# Patient Record
Sex: FEMALE | Race: WHITE | NOT HISPANIC OR LATINO | URBAN - METROPOLITAN AREA
[De-identification: names, ages, dates, MRNs, and addresses within clinical notes are randomized per-mention and may not be internally consistent; named-entity substitution may affect disease eponyms.]

---

## 2024-01-29 ENCOUNTER — OFFICE VISIT (OUTPATIENT)
Dept: OBSTETRICS AND GYNECOLOGY | Facility: CLINIC | Age: 29
End: 2024-01-29
Payer: COMMERCIAL

## 2024-01-29 VITALS
WEIGHT: 199 LBS | SYSTOLIC BLOOD PRESSURE: 106 MMHG | BODY MASS INDEX: 36.62 KG/M2 | DIASTOLIC BLOOD PRESSURE: 64 MMHG | HEIGHT: 62 IN

## 2024-01-29 DIAGNOSIS — Z01.419 ENCOUNTER FOR ANNUAL ROUTINE GYNECOLOGICAL EXAMINATION: Primary | ICD-10-CM

## 2024-01-29 DIAGNOSIS — Z30.49 ENCOUNTER FOR SURVEILLANCE OF NUVARING: ICD-10-CM

## 2024-01-29 PROCEDURE — 99385 PREV VISIT NEW AGE 18-39: CPT

## 2024-01-29 PROCEDURE — 88175 CYTOPATH C/V AUTO FLUID REDO: CPT | Mod: TC,GCY,WESLAB

## 2024-01-29 PROCEDURE — 1036F TOBACCO NON-USER: CPT

## 2024-01-29 RX ORDER — ETONOGESTREL AND ETHINYL ESTRADIOL VAGINAL RING .015; .12 MG/D; MG/D
1 RING VAGINAL
COMMUNITY
End: 2024-01-29 | Stop reason: SDUPTHER

## 2024-01-29 RX ORDER — ETONOGESTREL AND ETHINYL ESTRADIOL VAGINAL RING .015; .12 MG/D; MG/D
RING VAGINAL
Qty: 3 EACH | Refills: 3 | Status: SHIPPED | OUTPATIENT
Start: 2024-01-29

## 2024-01-29 ASSESSMENT — ENCOUNTER SYMPTOMS
FEVER: 0
FATIGUE: 0
UNEXPECTED WEIGHT CHANGE: 0
NAUSEA: 0
CHILLS: 0
OCCASIONAL FEELINGS OF UNSTEADINESS: 0
DIZZINESS: 0
LOSS OF SENSATION IN FEET: 0
DEPRESSION: 0
SHORTNESS OF BREATH: 0
DYSURIA: 0
COLOR CHANGE: 0
VOMITING: 0
HEADACHES: 0
ABDOMINAL PAIN: 0
COUGH: 0

## 2024-01-29 ASSESSMENT — LIFESTYLE VARIABLES
SKIP TO QUESTIONS 9-10: 1
HOW MANY STANDARD DRINKS CONTAINING ALCOHOL DO YOU HAVE ON A TYPICAL DAY: PATIENT DOES NOT DRINK
AUDIT-C TOTAL SCORE: 0
HOW OFTEN DO YOU HAVE SIX OR MORE DRINKS ON ONE OCCASION: NEVER
HOW OFTEN DO YOU HAVE A DRINK CONTAINING ALCOHOL: NEVER

## 2024-01-29 ASSESSMENT — PATIENT HEALTH QUESTIONNAIRE - PHQ9
SUM OF ALL RESPONSES TO PHQ9 QUESTIONS 1 & 2: 0
2. FEELING DOWN, DEPRESSED OR HOPELESS: NOT AT ALL
1. LITTLE INTEREST OR PLEASURE IN DOING THINGS: NOT AT ALL

## 2024-01-29 ASSESSMENT — PAIN SCALES - GENERAL: PAINLEVEL: 0-NO PAIN

## 2024-01-29 NOTE — PROGRESS NOTES
"Subjective   DESI PHILLIPS is a 28 y.o. female who is here for a routine GYN exam. Last saw Dr. Palomo 2019. Now a \"new\" patient. I've seen her mom (Kristen) and grandma (Lori)! She is doing well, no concerns or complaints. Tolerating the Nuvaring well; no CI to medication; needs refills. Denies breast changes or concerns. Denies vaginal concerns.     Complaints:   none  Periods: regular  Dysmenorrhea:  none    Current contraception: Nuvaring  History of abnormal Pap smear: no  History of abnormal mammogram: no      OB History          1    Para   1    Term   1            AB        Living   1         SAB        IAB        Ectopic        Multiple        Live Births   1                  Review of Systems   Constitutional:  Negative for chills, fatigue, fever and unexpected weight change.   Respiratory:  Negative for cough and shortness of breath.    Gastrointestinal:  Negative for abdominal pain, nausea and vomiting.   Genitourinary:  Negative for dyspareunia, dysuria, pelvic pain and vaginal discharge.   Skin:  Negative for color change and rash.   Neurological:  Negative for dizziness and headaches.       Objective   /64   Ht 1.562 m (5' 1.5\")   Wt 90.3 kg (199 lb)   LMP 2024   BMI 36.99 kg/m²        General:   Alert and oriented, in no acute distress   Neck: Supple. No visible thyromegaly.    Breast/Axilla: Normal to palpation bilaterally without masses, skin changes, or nipple discharge.    Abdomen: Soft, non-tender, without masses or organomegaly   Vulva: Normal architecture without erythema, masses, or lesions.    Vagina: Normal mucosa without lesions, masses, or atrophy. No abnormal vaginal discharge.    Cervix: Normal without masses, lesions, or signs of cervicitis; pap smear performed    Uterus: Normal, mobile, non-enlarged uterus   Adnexa: Normal without masses or lesions   Pelvic Floor Normal    Psych Normal affect. Normal mood.      Assessment/Plan   -Due for pap " smear, obtained.  -Continue Nuvaring as tolerated, no CI to medication, refills sent.     Jennifer Keller PA-C

## 2024-02-10 LAB
CYTOLOGY CMNT CVX/VAG CYTO-IMP: NORMAL
LAB AP CONTRACEPTIVE HISTORY: NORMAL
LAB AP HPV GENOTYPE QUESTION: YES
LAB AP HPV HR: NORMAL
LABORATORY COMMENT REPORT: NORMAL
LMP START DATE: NORMAL
PATH REPORT.TOTAL CANCER: NORMAL

## 2025-02-12 ENCOUNTER — OFFICE VISIT (OUTPATIENT)
Dept: OBSTETRICS AND GYNECOLOGY | Facility: CLINIC | Age: 30
End: 2025-02-12
Payer: COMMERCIAL

## 2025-02-12 VITALS
DIASTOLIC BLOOD PRESSURE: 74 MMHG | SYSTOLIC BLOOD PRESSURE: 108 MMHG | BODY MASS INDEX: 38.24 KG/M2 | HEIGHT: 62 IN | WEIGHT: 207.8 LBS

## 2025-02-12 DIAGNOSIS — Z30.49 ENCOUNTER FOR SURVEILLANCE OF NUVARING: ICD-10-CM

## 2025-02-12 DIAGNOSIS — Z01.419 ENCOUNTER FOR ANNUAL ROUTINE GYNECOLOGICAL EXAMINATION: Primary | ICD-10-CM

## 2025-02-12 PROCEDURE — 99395 PREV VISIT EST AGE 18-39: CPT

## 2025-02-12 PROCEDURE — 3008F BODY MASS INDEX DOCD: CPT

## 2025-02-12 PROCEDURE — 1036F TOBACCO NON-USER: CPT

## 2025-02-12 RX ORDER — ETONOGESTREL AND ETHINYL ESTRADIOL VAGINAL RING .015; .12 MG/D; MG/D
RING VAGINAL
Qty: 3 EACH | Refills: 3 | Status: SHIPPED | OUTPATIENT
Start: 2025-02-12

## 2025-02-12 ASSESSMENT — ENCOUNTER SYMPTOMS
COLOR CHANGE: 0
DEPRESSION: 0
OCCASIONAL FEELINGS OF UNSTEADINESS: 0
COUGH: 0
DIZZINESS: 0
FEVER: 0
NAUSEA: 0
UNEXPECTED WEIGHT CHANGE: 0
FATIGUE: 0
HEADACHES: 0
LOSS OF SENSATION IN FEET: 0
CHILLS: 0
ABDOMINAL PAIN: 0
VOMITING: 0
DYSURIA: 0
SHORTNESS OF BREATH: 0

## 2025-02-12 ASSESSMENT — LIFESTYLE VARIABLES
HOW OFTEN DO YOU HAVE A DRINK CONTAINING ALCOHOL: NEVER
SKIP TO QUESTIONS 9-10: 1
HOW MANY STANDARD DRINKS CONTAINING ALCOHOL DO YOU HAVE ON A TYPICAL DAY: PATIENT DOES NOT DRINK
HOW OFTEN DO YOU HAVE SIX OR MORE DRINKS ON ONE OCCASION: NEVER
AUDIT-C TOTAL SCORE: 0

## 2025-02-12 ASSESSMENT — PATIENT HEALTH QUESTIONNAIRE - PHQ9
SUM OF ALL RESPONSES TO PHQ9 QUESTIONS 1 & 2: 0
1. LITTLE INTEREST OR PLEASURE IN DOING THINGS: NOT AT ALL
2. FEELING DOWN, DEPRESSED OR HOPELESS: NOT AT ALL

## 2025-02-12 ASSESSMENT — PAIN SCALES - GENERAL: PAINLEVEL_OUTOF10: 0-NO PAIN

## 2025-02-12 NOTE — PROGRESS NOTES
"Subjective   DESI PHILLIPS is a 29 y.o. female who is here for a routine GYN exam. I last saw her 2024.  -Overall doing well! Her son is turning 3 yo and they're having big family party! Also working on building their patio at their home. Planning to try conceiving again starting in !  -Using Nuvaring currently continuously; more recently changed her ring and feels she might've done so 1 week later than usual; has been lightly spotting since  when she swapped rings. Will rarely bleed with continuous use; sometimes will lightly spot every 2 months or so.  -Denies breast or vaginal concerns today.    Complaints:   none  Periods: irregular/absent with continuous Nuvaring  Dysmenorrhea:  none    Current contraception: Nuvaring  History of abnormal Pap smear: no  History of abnormal mammogram: no      OB History          1    Para   1    Term   1            AB        Living   1         SAB        IAB        Ectopic        Multiple        Live Births   1                  Review of Systems   Constitutional:  Negative for chills, fatigue, fever and unexpected weight change.   Respiratory:  Negative for cough and shortness of breath.    Gastrointestinal:  Negative for abdominal pain, nausea and vomiting.   Genitourinary:  Negative for dyspareunia, dysuria, pelvic pain and vaginal discharge.   Skin:  Negative for color change and rash.   Neurological:  Negative for dizziness and headaches.       Objective   /74   Ht 1.562 m (5' 1.5\")   Wt 94.3 kg (207 lb 12.8 oz)   LMP 2025   BMI 38.63 kg/m²        General:   Alert and oriented, in no acute distress   Neck: Supple. No visible thyromegaly.    Breast/Axilla: Normal to palpation bilaterally without masses, skin changes, or nipple discharge.    Abdomen: Soft, non-tender, without masses or organomegaly   Vulva: Normal architecture without erythema, masses, or lesions.    Vagina: Normal mucosa without lesions, masses, or atrophy. No " abnormal vaginal discharge. Scant amt blood; nuvaring in place   Cervix: Normal without masses, lesions, or signs of cervicitis   Uterus: Normal, mobile, non-enlarged uterus   Adnexa: Normal without masses or lesions   Pelvic Floor Normal    Psych Normal affect. Normal mood.      Assessment/Plan   Diagnoses and all orders for this visit:  Encounter for annual routine gynecological examination   - UTD on pap smear, next due 1/2027.  Encounter for surveillance of nuvaring  -     etonogestreL-ethinyl estradioL (Nuvaring) 0.12-0.015 mg/24 hr vaginal ring; Insert ring vaginally, leave in place for 3 weeks total, then replace on week 4 for continuous use  - Suspect possible BTB with continuous vs delayed changing of device; scant blood on exam; plan to continue as prescribed; we rvwd again how often to change her ring; TCO if bleeding does not resolve.    Jennifer Keller PA-C

## 2025-05-19 ENCOUNTER — PATIENT MESSAGE (OUTPATIENT)
Dept: OBSTETRICS AND GYNECOLOGY | Facility: CLINIC | Age: 30
End: 2025-05-19
Payer: COMMERCIAL

## 2025-05-20 DIAGNOSIS — R63.5 WEIGHT GAIN: Primary | ICD-10-CM

## 2025-05-20 NOTE — PATIENT COMMUNICATION
Pt does exercise several times a week, patient would like to have blood work completed. Please place orders

## 2025-05-22 LAB
ALBUMIN SERPL-MCNC: 4.5 G/DL (ref 3.6–5.1)
ALP SERPL-CCNC: 39 U/L (ref 31–125)
ALT SERPL-CCNC: 41 U/L (ref 6–29)
ANION GAP SERPL CALCULATED.4IONS-SCNC: 10 MMOL/L (CALC) (ref 7–17)
AST SERPL-CCNC: 29 U/L (ref 10–30)
BILIRUB SERPL-MCNC: 0.7 MG/DL (ref 0.2–1.2)
BUN SERPL-MCNC: 19 MG/DL (ref 7–25)
CALCIUM SERPL-MCNC: 9.6 MG/DL (ref 8.6–10.2)
CHLORIDE SERPL-SCNC: 103 MMOL/L (ref 98–110)
CHOLEST SERPL-MCNC: 240 MG/DL
CHOLEST/HDLC SERPL: 4.5 (CALC)
CO2 SERPL-SCNC: 25 MMOL/L (ref 20–32)
CREAT SERPL-MCNC: 0.73 MG/DL (ref 0.5–0.97)
EGFRCR SERPLBLD CKD-EPI 2021: 113 ML/MIN/1.73M2
EST. AVERAGE GLUCOSE BLD GHB EST-MCNC: 117 MG/DL
EST. AVERAGE GLUCOSE BLD GHB EST-SCNC: 6.5 MMOL/L
GLUCOSE SERPL-MCNC: 94 MG/DL (ref 65–99)
HBA1C MFR BLD: 5.7 %
HDLC SERPL-MCNC: 53 MG/DL
LDLC SERPL CALC-MCNC: 152 MG/DL (CALC)
NONHDLC SERPL-MCNC: 187 MG/DL (CALC)
POTASSIUM SERPL-SCNC: 4.7 MMOL/L (ref 3.5–5.3)
PROT SERPL-MCNC: 7.2 G/DL (ref 6.1–8.1)
SODIUM SERPL-SCNC: 138 MMOL/L (ref 135–146)
TRIGL SERPL-MCNC: 201 MG/DL
TSH SERPL-ACNC: 3.16 MIU/L

## 2025-07-18 NOTE — PROGRESS NOTES
INITIAL OB VISIT  Subjective   Patient ID [id number]   DESI PHILLIPS is a 29 y.o.  at 9w0d, OLESYA 2026, by Last Menstrual Period who presents for an initial prenatal visit.  She is doing well today without issues. Denies vaginal bleeding or cramping.    Her last pap smear was:  24- NILM    Her pregnancy is notable for:  -Obesity: BMI 40.15 at NOB. Will need growth US at 30,36 wks and NST weekly starting at 34 weeks    OB History    Para Term  AB Living   2 1 1   1   SAB IAB Ectopic Multiple Live Births       1      # Outcome Date GA Lbr Marin/2nd Weight Sex Type Anes PTL Lv   2 Current            1 Term 23 38w5d  3.572 kg M CS-LTranv   ARIELLA      Complications: Hypertension     [place]  Depression Scale Total: 0    Medical History[1]    Surgical History[2]    Family History[3]     Current Outpatient Medications   Medication Instructions    etonogestreL-ethinyl estradioL (Nuvaring) 0.12-0.015 mg/24 hr vaginal ring Insert ring vaginally, leave in place for 3 weeks total, then replace on week 4 for continuous use    prenatal 115/iron/folic acid (PRENATAL 19 ORAL) Take by mouth.      RX Allergies[4]    Objective   Vitals  Visit Vitals  /82     Weight: 98 kg (216 lb)  Pregravid BMI: Could not be calculated  BP: 120/82        Physical Exam  General: Alert and oriented, in no acute distress.  Psych: Normal affect and mood. Able to answer questions appropriately.   Lungs: Non labored respirations.    PROCEDURE:  OB/GYN Transvaginal U/S  Intrauterine - Yes  Gestational sac- Present  Yolk Sac Not seen  Fetal Pole- Not seen    Prenatal Labs  Results for orders placed or performed in visit on 25   POCT pregnancy, urine manually resulted    Collection Time: 25 10:00 AM   Result Value Ref Range    Preg Test, Ur Positive (A) Negative   POCT UA Automated manually resulted    Collection Time: 25 10:00 AM   Result Value Ref Range    POC Color, Urine Yellow Straw,  Yellow, Light-Yellow    POC Appearance, Urine Clear Clear    POC Glucose, Urine NEGATIVE NEGATIVE mg/dl    POC Bilirubin, Urine NEGATIVE NEGATIVE    POC Ketones, Urine NEGATIVE NEGATIVE mg/dl    POC Specific Gravity, Urine 1.015 1.005 - 1.035    POC Blood, Urine TRACE-Intact (A) NEGATIVE    POC PH, Urine 7.0 No Reference Range Established PH    POC Protein, Urine NEGATIVE NEGATIVE mg/dl    POC Urobilinogen, Urine 0.2 0.2, 1.0 EU/DL    Poc Nitrite, Urine NEGATIVE NEGATIVE    POC Leukocytes, Urine TRACE (A) NEGATIVE     Assessment/Plan     29 y.o.  at 9w0d, OLESYA 2026, by Last Menstrual Period  Assessment & Plan  Pregnancy test positive ([facility])  -UPT positive in the office today  -Patient is asymptomatic  -Possible gestational sac seen without yolk sac or fetal pole  -Discussed possibilities of early intrauterine pregnancy vs incorrect dating vs abnormal pregnancy  -Advised to get a formal US for further evaluation along with BHCG, CBC, T&S  -Bleeding/return precautions reviewed thoroughly  -Follow up after US/labs   -She voiced understanding and is in agreement with this plan of care  Orders:    QUEST HCG, TOTAL, QN; Future    CBC Anemia Panel With Reflex,Pregnancy; Future    Type And Screen Is this order related to pregnancy or an upcoming surgery? No; Future    US PELVIS TRANSABDOMINAL WITH TRANSVAGINAL; Future    Urine culture    Caroline Jimenes MD         [1] History reviewed. No pertinent past medical history.  [2]   Past Surgical History:  Procedure Laterality Date     SECTION, LOW TRANSVERSE  23    NASAL SEPTUM SURGERY     [3]   Family History  Problem Relation Name Age of Onset    No Known Problems Mother      No Known Problems Father      Lung cancer Maternal Grandfather     [4] No Known Allergies

## 2025-07-21 ENCOUNTER — INITIAL PRENATAL (OUTPATIENT)
Dept: OBSTETRICS AND GYNECOLOGY | Facility: CLINIC | Age: 30
End: 2025-07-21
Payer: COMMERCIAL

## 2025-07-21 ENCOUNTER — LAB (OUTPATIENT)
Dept: LAB | Facility: HOSPITAL | Age: 30
End: 2025-07-21
Payer: COMMERCIAL

## 2025-07-21 VITALS — BODY MASS INDEX: 40.15 KG/M2 | WEIGHT: 216 LBS | SYSTOLIC BLOOD PRESSURE: 120 MMHG | DIASTOLIC BLOOD PRESSURE: 82 MMHG

## 2025-07-21 DIAGNOSIS — Z34.91 PRENATAL CARE IN FIRST TRIMESTER, UNSPECIFIED GRAVIDITY: Primary | ICD-10-CM

## 2025-07-21 DIAGNOSIS — Z32.01 PREGNANCY TEST POSITIVE (HHS-HCC): ICD-10-CM

## 2025-07-21 DIAGNOSIS — Z32.01 ENCOUNTER FOR PREGNANCY TEST, RESULT POSITIVE (HHS-HCC): Primary | ICD-10-CM

## 2025-07-21 LAB
ABO GROUP (TYPE) IN BLOOD: NORMAL
ANTIBODY SCREEN: NORMAL
ERYTHROCYTE [DISTWIDTH] IN BLOOD BY AUTOMATED COUNT: 13.7 % (ref 11.5–14.5)
HCT VFR BLD AUTO: 39.5 % (ref 36–46)
HGB BLD-MCNC: 13.1 G/DL (ref 12–16)
MCH RBC QN AUTO: 29.5 PG (ref 26–34)
MCHC RBC AUTO-ENTMCNC: 33.2 G/DL (ref 32–36)
MCV RBC AUTO: 89 FL (ref 80–100)
NRBC BLD-RTO: 0 /100 WBCS (ref 0–0)
PLATELET # BLD AUTO: 248 X10*3/UL (ref 150–450)
POC APPEARANCE, URINE: CLEAR
POC BILIRUBIN, URINE: NEGATIVE
POC BLOOD, URINE: ABNORMAL
POC COLOR, URINE: YELLOW
POC GLUCOSE, URINE: NEGATIVE MG/DL
POC KETONES, URINE: NEGATIVE MG/DL
POC LEUKOCYTES, URINE: ABNORMAL
POC NITRITE,URINE: NEGATIVE
POC PH, URINE: 7 PH
POC PROTEIN, URINE: NEGATIVE MG/DL
POC SPECIFIC GRAVITY, URINE: 1.01
POC UROBILINOGEN, URINE: 0.2 EU/DL
PREGNANCY TEST URINE, POC: POSITIVE
RBC # BLD AUTO: 4.44 X10*6/UL (ref 4–5.2)
RH FACTOR (ANTIGEN D): NORMAL
WBC # BLD AUTO: 6.5 X10*3/UL (ref 4.4–11.3)

## 2025-07-21 PROCEDURE — 86850 RBC ANTIBODY SCREEN: CPT

## 2025-07-21 PROCEDURE — 86900 BLOOD TYPING SEROLOGIC ABO: CPT

## 2025-07-21 PROCEDURE — 85027 COMPLETE CBC AUTOMATED: CPT

## 2025-07-21 PROCEDURE — 0500F INITIAL PRENATAL CARE VISIT: CPT | Performed by: STUDENT IN AN ORGANIZED HEALTH CARE EDUCATION/TRAINING PROGRAM

## 2025-07-21 PROCEDURE — 81025 URINE PREGNANCY TEST: CPT | Performed by: STUDENT IN AN ORGANIZED HEALTH CARE EDUCATION/TRAINING PROGRAM

## 2025-07-21 PROCEDURE — 81003 URINALYSIS AUTO W/O SCOPE: CPT | Mod: QW | Performed by: STUDENT IN AN ORGANIZED HEALTH CARE EDUCATION/TRAINING PROGRAM

## 2025-07-21 PROCEDURE — 86901 BLOOD TYPING SEROLOGIC RH(D): CPT

## 2025-07-21 ASSESSMENT — ENCOUNTER SYMPTOMS
DEPRESSION: 0
OCCASIONAL FEELINGS OF UNSTEADINESS: 0
LOSS OF SENSATION IN FEET: 0

## 2025-07-21 ASSESSMENT — EDINBURGH POSTNATAL DEPRESSION SCALE (EPDS)
I HAVE BEEN SO UNHAPPY THAT I HAVE BEEN CRYING: NO, NEVER
I HAVE BEEN ABLE TO LAUGH AND SEE THE FUNNY SIDE OF THINGS: AS MUCH AS I ALWAYS COULD
THE THOUGHT OF HARMING MYSELF HAS OCCURRED TO ME: NEVER
I HAVE BEEN ANXIOUS OR WORRIED FOR NO GOOD REASON: NO, NOT AT ALL
THINGS HAVE BEEN GETTING ON TOP OF ME: NO, I HAVE BEEN COPING AS WELL AS EVER
I HAVE BEEN SO UNHAPPY THAT I HAVE HAD DIFFICULTY SLEEPING: NOT AT ALL
I HAVE FELT SAD OR MISERABLE: NO, NOT AT ALL
TOTAL SCORE: 0
I HAVE LOOKED FORWARD WITH ENJOYMENT TO THINGS: AS MUCH AS I EVER DID
I HAVE BLAMED MYSELF UNNECESSARILY WHEN THINGS WENT WRONG: NO, NEVER
I HAVE FELT SCARED OR PANICKY FOR NO GOOD REASON: NO, NOT AT ALL

## 2025-07-21 ASSESSMENT — PAIN - FUNCTIONAL ASSESSMENT: PAIN_FUNCTIONAL_ASSESSMENT: 0-10

## 2025-07-21 ASSESSMENT — PAIN SCALES - GENERAL: PAINLEVEL_OUTOF10: 0 - NO PAIN

## 2025-07-22 LAB — REFLEX ADDED, ANEMIA PANEL: NORMAL

## 2025-07-23 LAB — B-HCG SERPL-ACNC: ABNORMAL MIU/ML

## 2025-07-25 ENCOUNTER — HOSPITAL ENCOUNTER (OUTPATIENT)
Dept: RADIOLOGY | Facility: HOSPITAL | Age: 30
Discharge: HOME | End: 2025-07-25
Payer: COMMERCIAL

## 2025-07-25 DIAGNOSIS — Z32.01 PREGNANCY TEST POSITIVE (HHS-HCC): ICD-10-CM

## 2025-07-25 PROCEDURE — 76801 OB US < 14 WKS SINGLE FETUS: CPT

## 2025-07-27 DIAGNOSIS — Z32.01 PREGNANCY TEST POSITIVE (HHS-HCC): ICD-10-CM

## 2025-07-28 ENCOUNTER — PATIENT MESSAGE (OUTPATIENT)
Dept: OBSTETRICS AND GYNECOLOGY | Facility: CLINIC | Age: 30
End: 2025-07-28
Payer: COMMERCIAL

## 2025-07-28 DIAGNOSIS — Z32.01 PREGNANCY TEST POSITIVE (HHS-HCC): Primary | ICD-10-CM

## 2025-07-28 NOTE — TELEPHONE ENCOUNTER
Patient called the office again. Advised her to monitor the bleeding and if she starts bleeding through more than a pad an hour to go to ER. Advised that the US has not been read yet. If Dr. Jimenes has not gotten back to me by tomorrow morning then I reach out to her at the hospital.

## 2025-07-29 DIAGNOSIS — Z32.01 PREGNANCY TEST POSITIVE (HHS-HCC): Primary | ICD-10-CM

## 2025-07-30 LAB — B-HCG SERPL-ACNC: ABNORMAL MIU/ML

## 2025-07-31 DIAGNOSIS — Z32.01 PREGNANCY TEST POSITIVE (HHS-HCC): ICD-10-CM

## 2025-08-01 LAB — B-HCG SERPL-ACNC: 8454 MIU/ML

## 2025-08-04 ENCOUNTER — APPOINTMENT (OUTPATIENT)
Dept: RADIOLOGY | Facility: HOSPITAL | Age: 30
End: 2025-08-04
Payer: COMMERCIAL

## 2025-08-04 DIAGNOSIS — Z32.01 PREGNANCY TEST POSITIVE (HHS-HCC): ICD-10-CM

## 2025-08-04 PROCEDURE — 76817 TRANSVAGINAL US OBSTETRIC: CPT | Performed by: STUDENT IN AN ORGANIZED HEALTH CARE EDUCATION/TRAINING PROGRAM

## 2025-08-04 PROCEDURE — 76815 OB US LIMITED FETUS(S): CPT | Performed by: STUDENT IN AN ORGANIZED HEALTH CARE EDUCATION/TRAINING PROGRAM

## 2025-08-04 PROCEDURE — 76830 TRANSVAGINAL US NON-OB: CPT

## 2025-08-06 ENCOUNTER — PATIENT MESSAGE (OUTPATIENT)
Dept: OBSTETRICS AND GYNECOLOGY | Facility: CLINIC | Age: 30
End: 2025-08-06
Payer: COMMERCIAL

## 2025-08-07 ENCOUNTER — HOSPITAL ENCOUNTER (INPATIENT)
Facility: HOSPITAL | Age: 30
End: 2025-08-07
Payer: COMMERCIAL

## 2025-08-07 ENCOUNTER — HOSPITAL ENCOUNTER (OUTPATIENT)
Facility: HOSPITAL | Age: 30
Setting detail: OBSERVATION
End: 2025-08-07
Payer: COMMERCIAL

## 2025-08-07 ENCOUNTER — HOSPITAL ENCOUNTER (INPATIENT)
Age: 30
End: 2025-08-07
Payer: COMMERCIAL

## 2025-08-07 ENCOUNTER — ANESTHESIA (OUTPATIENT)
Dept: OBSTETRICS AND GYNECOLOGY | Facility: HOSPITAL | Age: 30
End: 2025-08-07
Payer: COMMERCIAL

## 2025-08-07 ENCOUNTER — OFFICE VISIT (OUTPATIENT)
Dept: OBSTETRICS AND GYNECOLOGY | Facility: CLINIC | Age: 30
End: 2025-08-07
Payer: COMMERCIAL

## 2025-08-07 ENCOUNTER — HOSPITAL ENCOUNTER (OUTPATIENT)
Facility: HOSPITAL | Age: 30
Setting detail: OUTPATIENT SURGERY
End: 2025-08-07
Payer: COMMERCIAL

## 2025-08-07 ENCOUNTER — HOSPITAL ENCOUNTER (EMERGENCY)
Facility: HOSPITAL | Age: 30
Discharge: ANOTHER HOSPITAL | End: 2025-08-07
Attending: STUDENT IN AN ORGANIZED HEALTH CARE EDUCATION/TRAINING PROGRAM
Payer: COMMERCIAL

## 2025-08-07 ENCOUNTER — ANESTHESIA EVENT (OUTPATIENT)
Dept: OBSTETRICS AND GYNECOLOGY | Facility: HOSPITAL | Age: 30
End: 2025-08-07
Payer: COMMERCIAL

## 2025-08-07 ENCOUNTER — HOSPITAL ENCOUNTER (OUTPATIENT)
Facility: HOSPITAL | Age: 30
Discharge: HOME | End: 2025-08-07
Payer: COMMERCIAL

## 2025-08-07 VITALS — BODY MASS INDEX: 40.15 KG/M2 | DIASTOLIC BLOOD PRESSURE: 82 MMHG | WEIGHT: 216 LBS | SYSTOLIC BLOOD PRESSURE: 114 MMHG

## 2025-08-07 VITALS
HEART RATE: 108 BPM | HEIGHT: 62 IN | DIASTOLIC BLOOD PRESSURE: 60 MMHG | BODY MASS INDEX: 39.31 KG/M2 | TEMPERATURE: 98.2 F | WEIGHT: 213.63 LBS | SYSTOLIC BLOOD PRESSURE: 129 MMHG | RESPIRATION RATE: 16 BRPM | OXYGEN SATURATION: 99 %

## 2025-08-07 VITALS
OXYGEN SATURATION: 98 % | HEIGHT: 62 IN | HEART RATE: 113 BPM | SYSTOLIC BLOOD PRESSURE: 162 MMHG | DIASTOLIC BLOOD PRESSURE: 78 MMHG | WEIGHT: 213.63 LBS | TEMPERATURE: 98.1 F | BODY MASS INDEX: 39.31 KG/M2 | RESPIRATION RATE: 16 BRPM

## 2025-08-07 DIAGNOSIS — Z01.419 ENCOUNTER FOR ANNUAL ROUTINE GYNECOLOGICAL EXAMINATION: ICD-10-CM

## 2025-08-07 DIAGNOSIS — O03.9 SPONTANEOUS ABORTION: Primary | ICD-10-CM

## 2025-08-07 DIAGNOSIS — O03.4 INCOMPLETE SPONTANEOUS ABORTION WITHOUT COMPLICATION: Primary | ICD-10-CM

## 2025-08-07 LAB
ABO GROUP (TYPE) IN BLOOD: NORMAL
ABO GROUP (TYPE) IN BLOOD: NORMAL
ALBUMIN SERPL BCP-MCNC: 4.5 G/DL (ref 3.4–5)
ALP SERPL-CCNC: 37 U/L (ref 33–110)
ALT SERPL W P-5'-P-CCNC: 19 U/L (ref 7–45)
ANION GAP SERPL CALCULATED.3IONS-SCNC: 12 MMOL/L (ref 10–20)
ANTIBODY SCREEN: NORMAL
AST SERPL W P-5'-P-CCNC: 14 U/L (ref 9–39)
B-HCG SERPL-ACNC: 3655 MIU/ML
BASOPHILS # BLD AUTO: 0.03 X10*3/UL (ref 0–0.1)
BASOPHILS NFR BLD AUTO: 0.3 %
BILIRUB SERPL-MCNC: 0.4 MG/DL (ref 0–1.2)
BUN SERPL-MCNC: 16 MG/DL (ref 6–23)
CALCIUM SERPL-MCNC: 9.8 MG/DL (ref 8.6–10.3)
CHLORIDE SERPL-SCNC: 105 MMOL/L (ref 98–107)
CO2 SERPL-SCNC: 25 MMOL/L (ref 21–32)
CREAT SERPL-MCNC: 0.78 MG/DL (ref 0.5–1.05)
EGFRCR SERPLBLD CKD-EPI 2021: >90 ML/MIN/1.73M*2
EOSINOPHIL # BLD AUTO: 0.16 X10*3/UL (ref 0–0.7)
EOSINOPHIL NFR BLD AUTO: 1.8 %
ERYTHROCYTE [DISTWIDTH] IN BLOOD BY AUTOMATED COUNT: 13.4 % (ref 11.5–14.5)
GLUCOSE SERPL-MCNC: 124 MG/DL (ref 74–99)
HCT VFR BLD AUTO: 38.6 % (ref 36–46)
HGB BLD-MCNC: 12.6 G/DL (ref 12–16)
IMM GRANULOCYTES # BLD AUTO: 0.06 X10*3/UL (ref 0–0.7)
IMM GRANULOCYTES NFR BLD AUTO: 0.7 % (ref 0–0.9)
LYMPHOCYTES # BLD AUTO: 2.3 X10*3/UL (ref 1.2–4.8)
LYMPHOCYTES NFR BLD AUTO: 26.4 %
MCH RBC QN AUTO: 29.7 PG (ref 26–34)
MCHC RBC AUTO-ENTMCNC: 32.6 G/DL (ref 32–36)
MCV RBC AUTO: 91 FL (ref 80–100)
MONOCYTES # BLD AUTO: 0.41 X10*3/UL (ref 0.1–1)
MONOCYTES NFR BLD AUTO: 4.7 %
NEUTROPHILS # BLD AUTO: 5.76 X10*3/UL (ref 1.2–7.7)
NEUTROPHILS NFR BLD AUTO: 66.1 %
NRBC BLD-RTO: 0 /100 WBCS (ref 0–0)
PLATELET # BLD AUTO: 261 X10*3/UL (ref 150–450)
POTASSIUM SERPL-SCNC: 3.7 MMOL/L (ref 3.5–5.3)
PROT SERPL-MCNC: 7.2 G/DL (ref 6.4–8.2)
RBC # BLD AUTO: 4.24 X10*6/UL (ref 4–5.2)
RH FACTOR (ANTIGEN D): NORMAL
RH FACTOR (ANTIGEN D): NORMAL
SODIUM SERPL-SCNC: 138 MMOL/L (ref 136–145)
WBC # BLD AUTO: 8.7 X10*3/UL (ref 4.4–11.3)

## 2025-08-07 PROCEDURE — 3700000014 HC AN EPIDURAL BLOCK CHARGE

## 2025-08-07 PROCEDURE — 99285 EMERGENCY DEPT VISIT HI MDM: CPT

## 2025-08-07 PROCEDURE — 84702 CHORIONIC GONADOTROPIN TEST: CPT

## 2025-08-07 PROCEDURE — 85025 COMPLETE CBC W/AUTO DIFF WBC: CPT

## 2025-08-07 PROCEDURE — 36415 COLL VENOUS BLD VENIPUNCTURE: CPT

## 2025-08-07 PROCEDURE — 99291 CRITICAL CARE FIRST HOUR: CPT | Performed by: STUDENT IN AN ORGANIZED HEALTH CARE EDUCATION/TRAINING PROGRAM

## 2025-08-07 PROCEDURE — 2500000004 HC RX 250 GENERAL PHARMACY W/ HCPCS (ALT 636 FOR OP/ED)

## 2025-08-07 PROCEDURE — 2500000004 HC RX 250 GENERAL PHARMACY W/ HCPCS (ALT 636 FOR OP/ED): Performed by: NURSE ANESTHETIST, CERTIFIED REGISTERED

## 2025-08-07 PROCEDURE — 80053 COMPREHEN METABOLIC PANEL: CPT

## 2025-08-07 PROCEDURE — A59812 PR SURG RX INCOMPLETE ABORTN: Performed by: NURSE ANESTHETIST, CERTIFIED REGISTERED

## 2025-08-07 PROCEDURE — 7100000016 HC LABOR RECOVERY PER HOUR

## 2025-08-07 PROCEDURE — 88305 TISSUE EXAM BY PATHOLOGIST: CPT | Mod: TC,TRILAB

## 2025-08-07 PROCEDURE — 88305 TISSUE EXAM BY PATHOLOGIST: CPT | Performed by: STUDENT IN AN ORGANIZED HEALTH CARE EDUCATION/TRAINING PROGRAM

## 2025-08-07 PROCEDURE — 99213 OFFICE O/P EST LOW 20 MIN: CPT | Performed by: STUDENT IN AN ORGANIZED HEALTH CARE EDUCATION/TRAINING PROGRAM

## 2025-08-07 PROCEDURE — 1210000001 HC SEMI-PRIVATE ROOM DAILY

## 2025-08-07 PROCEDURE — 86850 RBC ANTIBODY SCREEN: CPT

## 2025-08-07 PROCEDURE — 86901 BLOOD TYPING SEROLOGIC RH(D): CPT

## 2025-08-07 PROCEDURE — 1036F TOBACCO NON-USER: CPT | Performed by: STUDENT IN AN ORGANIZED HEALTH CARE EDUCATION/TRAINING PROGRAM

## 2025-08-07 PROCEDURE — 3600000024 HC TREATMENT OF THERAPEUTIC ABORTION

## 2025-08-07 PROCEDURE — 99212 OFFICE O/P EST SF 10 MIN: CPT | Performed by: STUDENT IN AN ORGANIZED HEALTH CARE EDUCATION/TRAINING PROGRAM

## 2025-08-07 RX ORDER — SODIUM CHLORIDE, SODIUM LACTATE, POTASSIUM CHLORIDE, CALCIUM CHLORIDE 600; 310; 30; 20 MG/100ML; MG/100ML; MG/100ML; MG/100ML
40 INJECTION, SOLUTION INTRAVENOUS CONTINUOUS
Status: CANCELLED | OUTPATIENT
Start: 2025-08-07 | End: 2025-08-08

## 2025-08-07 RX ORDER — OXYCODONE HYDROCHLORIDE 5 MG/1
5 TABLET ORAL EVERY 6 HOURS PRN
Qty: 15 TABLET | Refills: 0 | Status: SHIPPED | OUTPATIENT
Start: 2025-08-07

## 2025-08-07 RX ORDER — OXYCODONE HYDROCHLORIDE 5 MG/1
10 TABLET ORAL EVERY 4 HOURS PRN
Refills: 0 | Status: CANCELLED | OUTPATIENT
Start: 2025-08-07

## 2025-08-07 RX ORDER — ONDANSETRON HYDROCHLORIDE 2 MG/ML
4 INJECTION, SOLUTION INTRAVENOUS EVERY 6 HOURS PRN
Status: CANCELLED | OUTPATIENT
Start: 2025-08-07

## 2025-08-07 RX ORDER — KETOROLAC TROMETHAMINE 30 MG/ML
30 INJECTION, SOLUTION INTRAMUSCULAR; INTRAVENOUS EVERY 6 HOURS
Status: CANCELLED | OUTPATIENT
Start: 2025-08-07 | End: 2025-08-08

## 2025-08-07 RX ORDER — ONDANSETRON 4 MG/1
4 TABLET, ORALLY DISINTEGRATING ORAL EVERY 6 HOURS PRN
Status: CANCELLED | OUTPATIENT
Start: 2025-08-07

## 2025-08-07 RX ORDER — ONDANSETRON HYDROCHLORIDE 2 MG/ML
INJECTION, SOLUTION INTRAVENOUS AS NEEDED
Status: DISCONTINUED | OUTPATIENT
Start: 2025-08-07 | End: 2025-08-07

## 2025-08-07 RX ORDER — FENTANYL CITRATE 50 UG/ML
INJECTION, SOLUTION INTRAMUSCULAR; INTRAVENOUS AS NEEDED
Status: DISCONTINUED | OUTPATIENT
Start: 2025-08-07 | End: 2025-08-07

## 2025-08-07 RX ORDER — MISOPROSTOL 200 UG/1
800 TABLET ORAL ONCE
Qty: 4 TABLET | Refills: 0 | Status: SHIPPED | OUTPATIENT
Start: 2025-08-07 | End: 2025-08-07 | Stop reason: HOSPADM

## 2025-08-07 RX ORDER — MIDAZOLAM HYDROCHLORIDE 1 MG/ML
INJECTION, SOLUTION INTRAMUSCULAR; INTRAVENOUS AS NEEDED
Status: DISCONTINUED | OUTPATIENT
Start: 2025-08-07 | End: 2025-08-07

## 2025-08-07 RX ORDER — SODIUM CHLORIDE, SODIUM LACTATE, POTASSIUM CHLORIDE, CALCIUM CHLORIDE 600; 310; 30; 20 MG/100ML; MG/100ML; MG/100ML; MG/100ML
20 INJECTION, SOLUTION INTRAVENOUS CONTINUOUS
Status: DISCONTINUED | OUTPATIENT
Start: 2025-08-07 | End: 2025-08-07 | Stop reason: HOSPADM

## 2025-08-07 RX ORDER — IBUPROFEN 600 MG/1
600 TABLET, FILM COATED ORAL EVERY 6 HOURS
Status: CANCELLED | OUTPATIENT
Start: 2025-08-08

## 2025-08-07 RX ORDER — NALOXONE HYDROCHLORIDE 0.4 MG/ML
0.1 INJECTION, SOLUTION INTRAMUSCULAR; INTRAVENOUS; SUBCUTANEOUS EVERY 5 MIN PRN
Status: CANCELLED | OUTPATIENT
Start: 2025-08-07

## 2025-08-07 RX ORDER — LIDOCAINE HCL/PF 100 MG/5ML
SYRINGE (ML) INTRAVENOUS AS NEEDED
Status: DISCONTINUED | OUTPATIENT
Start: 2025-08-07 | End: 2025-08-07

## 2025-08-07 RX ORDER — OXYCODONE HYDROCHLORIDE 5 MG/1
5 TABLET ORAL EVERY 4 HOURS PRN
Refills: 0 | Status: CANCELLED | OUTPATIENT
Start: 2025-08-07

## 2025-08-07 RX ORDER — IBUPROFEN 600 MG/1
600 TABLET, FILM COATED ORAL EVERY 6 HOURS PRN
Status: DISCONTINUED | OUTPATIENT
Start: 2025-08-07 | End: 2025-08-07 | Stop reason: HOSPADM

## 2025-08-07 RX ORDER — PROPOFOL 10 MG/ML
INJECTION, EMULSION INTRAVENOUS AS NEEDED
Status: DISCONTINUED | OUTPATIENT
Start: 2025-08-07 | End: 2025-08-07

## 2025-08-07 RX ORDER — CEFAZOLIN SODIUM 2 G/100ML
INJECTION, SOLUTION INTRAVENOUS
Status: COMPLETED
Start: 2025-08-07 | End: 2025-08-07

## 2025-08-07 RX ORDER — OXYCODONE HYDROCHLORIDE 5 MG/1
5 TABLET ORAL EVERY 6 HOURS PRN
Refills: 0 | Status: DISCONTINUED | OUTPATIENT
Start: 2025-08-07 | End: 2025-08-07 | Stop reason: HOSPADM

## 2025-08-07 RX ORDER — CEFAZOLIN SODIUM 2 G/100ML
2 INJECTION, SOLUTION INTRAVENOUS ONCE
Status: COMPLETED | OUTPATIENT
Start: 2025-08-07 | End: 2025-08-07

## 2025-08-07 RX ORDER — KETOROLAC TROMETHAMINE 30 MG/ML
INJECTION, SOLUTION INTRAMUSCULAR; INTRAVENOUS AS NEEDED
Status: DISCONTINUED | OUTPATIENT
Start: 2025-08-07 | End: 2025-08-07

## 2025-08-07 RX ORDER — SIMETHICONE 80 MG
80 TABLET,CHEWABLE ORAL 4 TIMES DAILY PRN
Status: CANCELLED | OUTPATIENT
Start: 2025-08-07

## 2025-08-07 RX ORDER — IBUPROFEN 600 MG/1
600 TABLET, FILM COATED ORAL EVERY 6 HOURS PRN
Qty: 40 TABLET | Refills: 0 | Status: SHIPPED | OUTPATIENT
Start: 2025-08-07 | End: 2025-08-17

## 2025-08-07 RX ADMIN — DEXAMETHASONE SODIUM PHOSPHATE 8 MG: 4 INJECTION, SOLUTION INTRAMUSCULAR; INTRAVENOUS at 18:01

## 2025-08-07 RX ADMIN — PROPOFOL 200 MG: 10 INJECTION, EMULSION INTRAVENOUS at 18:00

## 2025-08-07 RX ADMIN — SODIUM CHLORIDE, POTASSIUM CHLORIDE, SODIUM LACTATE AND CALCIUM CHLORIDE: 600; 310; 30; 20 INJECTION, SOLUTION INTRAVENOUS at 17:49

## 2025-08-07 RX ADMIN — FENTANYL CITRATE 50 MCG: 0.05 INJECTION, SOLUTION INTRAMUSCULAR; INTRAVENOUS at 18:07

## 2025-08-07 RX ADMIN — PROPOFOL 30 MG: 10 INJECTION, EMULSION INTRAVENOUS at 18:08

## 2025-08-07 RX ADMIN — KETOROLAC TROMETHAMINE 30 MG: 30 INJECTION, SOLUTION INTRAMUSCULAR at 18:18

## 2025-08-07 RX ADMIN — ONDANSETRON 4 MG: 2 INJECTION, SOLUTION INTRAMUSCULAR; INTRAVENOUS at 17:59

## 2025-08-07 RX ADMIN — FENTANYL CITRATE 50 MCG: 0.05 INJECTION, SOLUTION INTRAMUSCULAR; INTRAVENOUS at 17:58

## 2025-08-07 RX ADMIN — CEFAZOLIN SODIUM 2 G: 2 INJECTION, SOLUTION INTRAVENOUS at 17:49

## 2025-08-07 RX ADMIN — PROPOFOL 20 MG: 10 INJECTION, EMULSION INTRAVENOUS at 18:14

## 2025-08-07 RX ADMIN — MIDAZOLAM 2 MG: 1 INJECTION INTRAMUSCULAR; INTRAVENOUS at 17:52

## 2025-08-07 RX ADMIN — OXYTOCIN 600 MILLI-UNITS/MIN: 10 INJECTION, SOLUTION INTRAMUSCULAR; INTRAVENOUS at 18:16

## 2025-08-07 RX ADMIN — LIDOCAINE HYDROCHLORIDE 60 MG: 20 INJECTION, SOLUTION INTRAVENOUS at 18:00

## 2025-08-07 SDOH — SOCIAL STABILITY: SOCIAL INSECURITY: ARE YOU OR HAVE YOU BEEN THREATENED OR ABUSED PHYSICALLY, EMOTIONALLY, OR SEXUALLY BY ANYONE?: NO

## 2025-08-07 SDOH — SOCIAL STABILITY: SOCIAL INSECURITY: WITHIN THE LAST YEAR, HAVE YOU BEEN AFRAID OF YOUR PARTNER OR EX-PARTNER?: NO

## 2025-08-07 SDOH — ECONOMIC STABILITY: FOOD INSECURITY: WITHIN THE PAST 12 MONTHS, YOU WORRIED THAT YOUR FOOD WOULD RUN OUT BEFORE YOU GOT THE MONEY TO BUY MORE.: NEVER TRUE

## 2025-08-07 SDOH — HEALTH STABILITY: MENTAL HEALTH: WERE YOU ABLE TO COMPLETE ALL THE BEHAVIORAL HEALTH SCREENINGS?: YES

## 2025-08-07 SDOH — HEALTH STABILITY: MENTAL HEALTH: WISH TO BE DEAD (PAST 1 MONTH): NO

## 2025-08-07 SDOH — SOCIAL STABILITY: SOCIAL INSECURITY: PHYSICAL ABUSE: DENIES

## 2025-08-07 SDOH — SOCIAL STABILITY: SOCIAL INSECURITY
WITHIN THE LAST YEAR, HAVE YOU BEEN KICKED, HIT, SLAPPED, OR OTHERWISE PHYSICALLY HURT BY YOUR PARTNER OR EX-PARTNER?: NO

## 2025-08-07 SDOH — SOCIAL STABILITY: SOCIAL INSECURITY: WITHIN THE LAST YEAR, HAVE YOU BEEN HUMILIATED OR EMOTIONALLY ABUSED IN OTHER WAYS BY YOUR PARTNER OR EX-PARTNER?: NO

## 2025-08-07 SDOH — SOCIAL STABILITY: SOCIAL INSECURITY: HAVE YOU HAD THOUGHTS OF HARMING ANYONE ELSE?: NO

## 2025-08-07 SDOH — SOCIAL STABILITY: SOCIAL INSECURITY: ARE THERE ANY APPARENT SIGNS OF INJURIES/BEHAVIORS THAT COULD BE RELATED TO ABUSE/NEGLECT?: NO

## 2025-08-07 SDOH — SOCIAL STABILITY: SOCIAL INSECURITY: HAS ANYONE EVER THREATENED TO HURT YOUR FAMILY OR YOUR PETS?: NO

## 2025-08-07 SDOH — ECONOMIC STABILITY: FOOD INSECURITY: WITHIN THE PAST 12 MONTHS, THE FOOD YOU BOUGHT JUST DIDN'T LAST AND YOU DIDN'T HAVE MONEY TO GET MORE.: NEVER TRUE

## 2025-08-07 SDOH — ECONOMIC STABILITY: FOOD INSECURITY: HOW HARD IS IT FOR YOU TO PAY FOR THE VERY BASICS LIKE FOOD, HOUSING, MEDICAL CARE, AND HEATING?: NOT HARD AT ALL

## 2025-08-07 SDOH — HEALTH STABILITY: MENTAL HEALTH: CURRENT SMOKER: 0

## 2025-08-07 SDOH — ECONOMIC STABILITY: TRANSPORTATION INSECURITY: IN THE PAST 12 MONTHS, HAS LACK OF TRANSPORTATION KEPT YOU FROM MEDICAL APPOINTMENTS OR FROM GETTING MEDICATIONS?: NO

## 2025-08-07 SDOH — HEALTH STABILITY: MENTAL HEALTH: SUICIDAL BEHAVIOR (LIFETIME): NO

## 2025-08-07 SDOH — SOCIAL STABILITY: SOCIAL INSECURITY
WITHIN THE LAST YEAR, HAVE YOU BEEN RAPED OR FORCED TO HAVE ANY KIND OF SEXUAL ACTIVITY BY YOUR PARTNER OR EX-PARTNER?: NO

## 2025-08-07 SDOH — HEALTH STABILITY: MENTAL HEALTH: NON-SPECIFIC ACTIVE SUICIDAL THOUGHTS (PAST 1 MONTH): NO

## 2025-08-07 SDOH — SOCIAL STABILITY: SOCIAL INSECURITY: HAVE YOU HAD ANY THOUGHTS OF HARMING ANYONE ELSE?: NO

## 2025-08-07 SDOH — SOCIAL STABILITY: SOCIAL INSECURITY: ABUSE SCREEN: ADULT

## 2025-08-07 SDOH — SOCIAL STABILITY: SOCIAL INSECURITY: VERBAL ABUSE: DENIES

## 2025-08-07 SDOH — SOCIAL STABILITY: SOCIAL INSECURITY: DOES ANYONE TRY TO KEEP YOU FROM HAVING/CONTACTING OTHER FRIENDS OR DOING THINGS OUTSIDE YOUR HOME?: NO

## 2025-08-07 SDOH — ECONOMIC STABILITY: HOUSING INSECURITY: DO YOU FEEL UNSAFE GOING BACK TO THE PLACE WHERE YOU ARE LIVING?: NO

## 2025-08-07 SDOH — SOCIAL STABILITY: SOCIAL INSECURITY: DO YOU FEEL ANYONE HAS EXPLOITED OR TAKEN ADVANTAGE OF YOU FINANCIALLY OR OF YOUR PERSONAL PROPERTY?: NO

## 2025-08-07 ASSESSMENT — LIFESTYLE VARIABLES
HOW OFTEN DO YOU HAVE A DRINK CONTAINING ALCOHOL: NEVER
AUDIT-C TOTAL SCORE: 0
AUDIT-C TOTAL SCORE: 0
HOW OFTEN DO YOU HAVE 6 OR MORE DRINKS ON ONE OCCASION: NEVER
SKIP TO QUESTIONS 9-10: 1
HOW MANY STANDARD DRINKS CONTAINING ALCOHOL DO YOU HAVE ON A TYPICAL DAY: PATIENT DOES NOT DRINK

## 2025-08-07 ASSESSMENT — ACTIVITIES OF DAILY LIVING (ADL)
LACK_OF_TRANSPORTATION: NO
LACK_OF_TRANSPORTATION: NO

## 2025-08-07 ASSESSMENT — PATIENT HEALTH QUESTIONNAIRE - PHQ9
1. LITTLE INTEREST OR PLEASURE IN DOING THINGS: NOT AT ALL
2. FEELING DOWN, DEPRESSED OR HOPELESS: NOT AT ALL
SUM OF ALL RESPONSES TO PHQ9 QUESTIONS 1 & 2: 0
1. LITTLE INTEREST OR PLEASURE IN DOING THINGS: NOT AT ALL
2. FEELING DOWN, DEPRESSED OR HOPELESS: NOT AT ALL
SUM OF ALL RESPONSES TO PHQ9 QUESTIONS 1 & 2: 0

## 2025-08-07 ASSESSMENT — ENCOUNTER SYMPTOMS
LOSS OF SENSATION IN FEET: 0
OCCASIONAL FEELINGS OF UNSTEADINESS: 0
DEPRESSION: 0

## 2025-08-07 ASSESSMENT — PAIN SCALES - GENERAL
PAINLEVEL_OUTOF10: 0-NO PAIN
PAIN_LEVEL: 0
PAINLEVEL_OUTOF10: 0 - NO PAIN
PAINLEVEL_OUTOF10: 2
PAINLEVEL_OUTOF10: 0 - NO PAIN

## 2025-08-07 ASSESSMENT — PAIN - FUNCTIONAL ASSESSMENT: PAIN_FUNCTIONAL_ASSESSMENT: 0-10

## 2025-08-07 NOTE — ED PROVIDER NOTES
Attestation to documentation Level 1-3    The documented history and physical examination was done by the PA/NP. The documented history and physical exam was reviewed and I performed an independent history and physical exam in conjunction. I have personally seen and examined the patient. I have fully participated in the care of this patient. I have reviewed all pertinent clinical information including history, physical exam and plan.    Physical Examination     General:  Well appearing, speaking in full sentences  Consent granted prior to pelvic exam with bimanual manipulation by patient confirmed by OBGYLUTHER Joya in the room    Vaginal vault is full of clotted red fresh blood, no laceration noted, cervix is palpated and 1cm dilated, cleaned out the clot burden and refills slowly with bright red blood, OBGYN called and to bedside for possible D&C    Plan: Take to OBGYN OR for D&C    Salazar John MD Attending EM physician       Salazar John MD  08/07/25 2062       Salazar John MD  08/07/25 6697

## 2025-08-07 NOTE — OP NOTE
Date: 2025  OR Location: [place] [address] OB    Name: Dnea Enamorado, : 1995, Age: 29 y.o., MRN: [id number], Sex: female    Diagnosis  Pre-op Diagnosis      * Incomplete spontaneous  without complication [O03.4] Post-op Diagnosis     * Incomplete spontaneous  without complication [O03.4]     Procedures    * OBGYN D&C and/or Evaluation Post TAB  Suction D&C with minimal sharp    Surgeons      * Tamie Paz - Primary    Resident/Fellow/Other Assistant:  Surgeons and Role:  * No surgeons found with a matching role *    Staff:   Circulator: Sarah  Circulator: Rebekah Carrillo Person: Sung    Anesthesia Staff: CRNA: BRYNN Platt-CRNA    Procedure Summary  Anesthesia: General  ASA: II  Estimated Blood Loss: 50mL  Intra-op Medications:   Administrations occurring from 1554 to 1827 on 25:   Medication Name Total Dose   lactated Ringer's infusion 75 mL   ceFAZolin (Ancef) 2 g in dextrose (iso)  mL 2 g   ceFAZolin (Ancef) in dextrose (iso) IV 2 gram/100 mL  - Omnicell Override Pull Cannot be calculated              Anesthesia Record               Intraprocedure I/O Totals          Intake    Oxytocin Drip 0.00 mL    The total shown is the total volume documented since Anesthesia Start was filed.    lactated Ringer's infusion 600.00 mL    Total Intake 600 mL       Output    Total Blood Loss - Surgical Delivery (mL) 50 mL    Total Output 50 mL       Net    Net Volume 550 mL       Other (could not be determined as input or output)    Surgical Delivery Estimated Blood Loss (mL) 50          Specimen:   ID Type Source Tests Collected by Time   1 : products from [employer] SURGICAL PATHOLOGY EXAM Tamie Paz MD 2025 1810                 Procedure Details:  The patient was seen in the preoperative area. The site of surgery was properly noted/marked if necessary per policy. The patient has been actively warmed in preoperative area. Preoperative  antibiotics are not indicated. Venous thrombosis prophylaxis are not indicated.      The patient was taken to the OR. The patient was given LMA anesthesia.  The legs were then placed in the modified dorsal lithotomy position in candy cane stirrups.      The lower abdomen, vagina, vulvar areas were prepped in the usual sterile fashion.  The patient was then draped in the usual sterile fashion.     A weighted vaginal speculum was placed in the vagina, a ring forcep was placed on the anterior lip of the cervix which was noted to be open.  Suction curetting was then performed with a size 8 suction curette until felt to be adequate.  Minimal sharp curetting was then performed followed by a few more suction curetting.  Hemostasis was noted.  The ring forcep and weighted vaginal speculum was removed.  Pitocin was utilized per protocol.    The patient was then placed back in the supine position and transferred to the recovery room in stable condition.    Tape count correct at the end of the procedure.    Findings: 8 week size uterus, POC including placental tissue noted.  Pathology specimen sent.    Complications:  None; patient tolerated the procedure well.     Disposition: PACU - hemodynamically stable.  Condition: stable

## 2025-08-07 NOTE — ED PROVIDER NOTES
HPI   Chief Complaint   Patient presents with    Vaginal Bleeding - Pregnant     Pt is 6 weeks pregnant.  Complains heavy vaginal bleeding, lightheadedness, nausea, abd cramping.  Second pregnancy.  1 full term.       HPI  29-year-old G2, P1 female presents to ER for evaluation of vaginal bleeding.  The patient reports that she is 6 weeks pregnant and is currently having a miscarriage.  She reports that she did see her OB gynecologist this morning because she has been having some vaginal bleeding that has been lighter over the past week and a half and they have been trending her beta hCG levels which is consistent with a spontaneous .  She reports she did have an ultrasound Monday confirming spontaneous abruption but still had the gestational sac to pass.  Reports today after getting home she did have some abdominal cramping followed by heavy vaginal bleeding states she that she has not been able to get off the toilet for the past few hours and has been changing her pad every 5 minutes due to the bleeding.  Reports that she was feeling lightheaded, chilled and somewhat nauseous with the bleeding thus she presents to ER for assessment.  She denies any fevers, abnormal vaginal discharge or foul odor.  Otherwise has no acute complaints.      Patient History   Medical History[1]  Surgical History[2]  Family History[3]  Social History[4]    Physical Exam   ED Triage Vitals [25 1359]   Temperature Heart Rate Respirations BP   36.7 °C (98.1 °F) (!) 118 16 (!) 152/96      Pulse Ox Temp src Heart Rate Source Patient Position   98 % -- -- --      BP Location FiO2 (%)     -- --       Physical Exam  Vitals and nursing note reviewed.   Constitutional:       General: She is not in acute distress.     Appearance: She is well-developed.   HENT:      Head: Normocephalic and atraumatic.     Eyes:      Conjunctiva/sclera: Conjunctivae normal.       Cardiovascular:      Rate and Rhythm: Regular rhythm. Tachycardia  present.      Heart sounds: No murmur heard.  Pulmonary:      Effort: Pulmonary effort is normal. No respiratory distress.      Breath sounds: Normal breath sounds.   Abdominal:      Palpations: Abdomen is soft.      Tenderness: There is no abdominal tenderness.   Genitourinary:     Comments: Large clots with approximately 100 cc spilling from the vaginal vault, no active bleed but the vaginal vault is filling with blood slowly on exam    Musculoskeletal:         General: No swelling.      Cervical back: Neck supple.     Skin:     General: Skin is warm and dry.      Capillary Refill: Capillary refill takes less than 2 seconds.     Neurological:      Mental Status: She is alert.     Psychiatric:         Mood and Affect: Mood normal.           ED Course & MDM   ED Course as of 25 1620   Thu Aug 07, 2025   1533 Pelvic exam reveals multiple large vaginal blood clots evacuated vaginal vault no active bleeding on exam though spoke with OB gynecologist Dr. Paz who will evaluate the patient at bedside [JJ]   1545 Dr. Paz at bedside.  Patient will be transferred to OB floor for D&C [JJ]      ED Course User Index  [JJ] Ruma Beach PA-C         Diagnoses as of 25 1620   Spontaneous                  No data recorded     Tyrese Coma Scale Score: 15 (25 1455 : Rossy Muhammad RN)                           Medical Decision Making  Parts of this chart have been completed using voice recognition software. Please excuse any errors of transcription.  My thought process and reason for plan has been formulated from the time that I saw the patient until the time of disposition and is not specific to one specific moment during their visit and furthermore my MDM encompasses this entire chart and not only this text box.      HPI: Detailed above.    Exam: A medically appropriate exam performed, outlined above, given the known history and presentation.    History obtained from: Patient    Medications  given during visit:  Medications - No data to display     Diagnostic/tests  Labs Reviewed   COMPREHENSIVE METABOLIC PANEL - Abnormal       Result Value    Glucose 124 (*)     Sodium 138      Potassium 3.7      Chloride 105      Bicarbonate 25      Anion Gap 12      Urea Nitrogen 16      Creatinine 0.78      eGFR >90      Calcium 9.8      Albumin 4.5      Alkaline Phosphatase 37      Total Protein 7.2      AST 14      Bilirubin, Total 0.4      ALT 19     HUMAN CHORIONIC GONADOTROPIN, SERUM QUANTITATIVE - Abnormal    HCG, Beta-Quantitative 3,655 (*)     Narrative:      Total HCG measurement is performed using the [employer] [place] Access   Immunoassay which detects intact HCG and free beta HCG subunit.    This test is not indicated for use as a tumor marker.   HCG testing is performed using a different test methodology at [facility] than other [facility]. Direct result comparison   should only be made within the same method.       CBC WITH AUTO DIFFERENTIAL    WBC 8.7      nRBC 0.0      RBC 4.24      Hemoglobin 12.6      Hematocrit 38.6      MCV 91      MCH 29.7      MCHC 32.6      RDW 13.4      Platelets 261      Neutrophils % 66.1      Immature Granulocytes %, Automated 0.7      Lymphocytes % 26.4      Monocytes % 4.7      Eosinophils % 1.8      Basophils % 0.3      Neutrophils Absolute 5.76      Immature Granulocytes Absolute, Automated 0.06      Lymphocytes Absolute 2.30      Monocytes Absolute 0.41      Eosinophils Absolute 0.16      Basophils Absolute 0.03     TYPE AND SCREEN    ABO TYPE O      Rh TYPE POS      ANTIBODY SCREEN NEG     VERAB/VERIFY ABORH    ABO TYPE O      Rh TYPE POS        No orders to display        Considerations/further MDM:  Patient is a 29-year-old female presenting for evaluation of vaginal bleeding    I saw this patient in conjunction with Dr. John.  Patient is awake alert clinically nontoxic-appearing and hypertensive but tachycardic upon arrival.  Patient with  known miscarriage with downtrending beta-hCG levels physical exam does reveal large clots passing with some slow oozing active on exam concerning for retained products of conception with active bleeding.  Obtain consultation from OB gynecology.  [place] [facility] [place] management of her vaginal bleeding.  She is agreeable with this plan of care.      Procedure  Critical Care    Performed by: Ruma Beach PA-C  Authorized by: Salazar John MD    Critical care provider statement:     Critical care time (minutes):  32    Critical care time was exclusive of:  Separately billable procedures and treating other patients    Critical care was necessary to treat or prevent imminent or life-threatening deterioration of the following conditions:  Other (use comment box to enter another option) (Miscarriage with active bleeding)    Critical care was time spent personally by me on the following activities:  Development of treatment plan with patient or surrogate, discussions with consultants, examination of patient, obtaining history from patient or surrogate, ordering and performing treatments and interventions and ordering and review of laboratory studies    Care discussed with: admitting provider           [1] History reviewed. No pertinent past medical history.  [2]   Past Surgical History:  Procedure Laterality Date    ANTERIOR CRUCIATE LIGAMENT REPAIR      x2     SECTION, LOW TRANSVERSE  23    NASAL SEPTUM SURGERY     [3]   Family History  Problem Relation Name Age of Onset    No Known Problems Mother      No Known Problems Father      Lung cancer Maternal Grandfather     [4]   Social History  Tobacco Use    Smoking status: Never    Smokeless tobacco: Never   Vaping Use    Vaping status: Never Used   Substance Use Topics    Alcohol use: Not Currently    Drug use: Never        Ruma Beach PA-C  25 [id number]

## 2025-08-07 NOTE — ANESTHESIA POSTPROCEDURE EVALUATION
Patient: Dena Enamorado    Procedure Summary       Date: 25 Room / Location: [facility]    Anesthesia Start:  Anesthesia Stop:     Procedure: OBGYN D&C and/or Evaluation Post TAB Diagnosis:       Incomplete spontaneous  without complication      (Incomplete spontaneous  without complication [O03.4])    Surgeons: Tamie Paz MD Responsible Provider: ELTON Platt    Anesthesia Type: general ASA Status: 2 - Emergent            Anesthesia Type: general    Vitals Value Taken Time   /82 25 18:30   Temp 36.6°C (98.4 °F) 25 18:30   Pulse 132 25 18:35   Resp 17 25 18:30   SpO2 97% 25 18:35       Anesthesia Post Evaluation    Patient location during evaluation: bedside  Patient participation: complete - patient participated  Level of consciousness: awake and alert  Pain score: 0  Pain management: adequate  Airway patency: patent  Cardiovascular status: tachycardic  Respiratory status: acceptable  Hydration status: acceptable  Postoperative Nausea and Vomiting: none        No notable events documented.

## 2025-08-07 NOTE — H&P
OB Triage H&P    Assessment/Plan    Dena Enamorado is a 29 y.o.  at Unknown, OLESYA: Not found., who presents to triage with profuse vaginal bleeding with clots. She was bleeding the toilet for about an hour before presenting to the ER. She has a known nonviable pregnancy followed with ultrasounds and quantitative hCGs. Her last menstrual period was in May.    History of C section with full term baby in . Otherwise healthy. She last ate at 8:30am, and last had sips of water at 2:45pm.    Evaluate by ER doctor noted large amount of blood in the vaginal vault, cervix is open.     Plan    -Suction D&C  -Procedure and risks discussed, patient would like to proceed    Discussed plan and reviewed with: patient    Pregnancy Problems (from 24 to present)       No problems associated with this episode.            Subjective   Refer to Assessment.    Prenatal Provider Tamie Paz    OB History    Para Term  AB Living   3 1 1 0 1 1   SAB IAB Ectopic Multiple Live Births   1 0 0 0 1      # Outcome Date GA Lbr Marin/2nd Weight Sex Type Anes PTL Lv   3 Current            2 Term 23 38w5d  3.572 kg M CS-LTranv   ARIELLA      Complications: Hypertension      Name: KARLIE   1 SAB                Surgical History[1]    Social History     Tobacco Use    Smoking status: Never    Smokeless tobacco: Never   Substance Use Topics    Alcohol use: Not Currently       Allergies[2]    Prescriptions Prior to Admission[3]  Objective     Last Vitals  Temp Pulse Resp BP MAP O2 Sat                   Blood Pressures    No data found in the last 1 encounters.          Physical Exam  General: NAD, mood appropriate  Cardiopulmonary: warm and well perfused, breathing comfortably on room air  Perineum: saturated with blood  Abdomen: Gravid, non-tender  Extremities: Symmetric    Chaperone Present: Declined.  Chaperone Name/Title:   Examination Chaperoned:     Labs in chart were reviewed.  CBC   Recent Labs      25  1448   WBC 8.7   HGB 12.6   HCT 38.6        CMP   Recent Labs     25  1448      K 3.7      CO2 25   ANIONGAP 12   BUN 16   CREATININE 0.78   EGFR >90   CALCIUM 9.8   ALBUMIN 4.5   PROT 7.2   ALKPHOS 37   ALT 19   AST 14   BILITOT 0.4      Results from last 7 days   Lab Units 25  1448   WBC AUTO x10*3/uL 8.7   HEMOGLOBIN g/dL 12.6   HEMATOCRIT % 38.6   PLATELETS AUTO x10*3/uL 261   AST U/L 14   ALT U/L 19   CREATININE mg/dL 0.78                 [1]   Past Surgical History:  Procedure Laterality Date    ANTERIOR CRUCIATE LIGAMENT REPAIR      x2     SECTION, LOW TRANSVERSE  23    NASAL SEPTUM SURGERY     [2] No Known Allergies  [3]   Medications Prior to Admission   Medication Sig Dispense Refill Last Dose/Taking    miSOPROStoL (Cytotec) 200 mcg tablet Insert 4 tablets (800 mcg) into the vagina 1 time for 1 dose. Insert 4 tablets high into vagina once. May repeat dose 3 days later if needed. 4 tablet 0

## 2025-08-07 NOTE — PROGRESS NOTES
GYNECOLOGY OFFICE VISIT   Subjective     History Of Present Illness:    Dena Enamorado is a 29 y.o.  presenting for Miscarriage.    She is here for follow up of SAB. She started bleeding  and has been bleeding since then that she describes as light. She states that yesterday it became more heavy, requiring a pad, associated with passage of blood clot and tissue and associated cramping. She thinks that she passed the pregnancy. She denies fever, chills, lightheadedness, dizziness, CP, SOB, significant abdominal pain.     Menstrual History:  OB History          2    Para   1    Term   1            AB   1    Living   1         SAB   1    IAB        Ectopic        Multiple        Live Births   1                Patient's last menstrual period was 2025 (exact date).       Past Medical History:  Medical History[1]    Past Surgical History:  Surgical History[2]      Social History:  Social History     Tobacco Use    Smoking status: Never    Smokeless tobacco: Never   Substance Use Topics    Alcohol use: Not Currently     Family History:  Family History[3]     Allergies:  Patient has no known allergies.    Outpatient Medications:  No current outpatient medications      Objective   Last Recorded Vitals:  Visit Vitals  /82     Physical Exam:  General: Alert and oriented, in no acute distress.  Psych: Normal affect and mood. Able to answer questions appropriately.   Lungs: Non labored respirations.  Abdomen: Soft, non-tender, no rebound or guarding   GYN:  Speculum:  Vagina: Normal moist mucosa without lesions, masses, or atrophy. Small to moderate amount of dark red blood in the vaginal vault.   Cervix: Visually fingertip dilated.  Bimanual:   Cervix: Cervix is 1 cm dilated.  Uterus: Normal, mobile, non-enlarged, non tender uterus.  Adnexa: Normal without tenderness, nodularity, masses, or lesions.    Assessment/Plan     29 y.o.  , here for miscarriage/SAB follow up  Assessment  & Plan  Spontaneous   -Discussed findings with the patient and that she has had early pregnancy loss based on US findings and down trending BHCG in the setting of her having heavy bleeding/cramping  -Sympathy expressed  -Reviewed management options including: expectant management, medical management with Misoprostol, and surgical management with a suction D&C  -R/B/A of each option discussed thoroughly   -Patient also provided with ACOG patient educational information regarding early pregnancy loss  -She would like to proceed with medical management with Misoprostol, administration directions reviewed   -Bleeding/return precautions reviewed thoroughly along with signs/symptoms to watch out for (including septic AB precautions)  -Patient voiced understanding of all of this information and is in agreement with this plan of care and all questions were answered  Orders:    miSOPROStoL (Cytotec) 200 mcg tablet; Insert 4 tablets (800 mcg) into the vagina 1 time for 1 dose. Insert 4 tablets high into vagina once. May repeat dose 3 days later if needed.      Caroline Jimenes MD         [1] History reviewed. No pertinent past medical history.  [2]   Past Surgical History:  Procedure Laterality Date    ANTERIOR CRUCIATE LIGAMENT REPAIR      x2     SECTION, LOW TRANSVERSE  23    NASAL SEPTUM SURGERY     [3]   Family History  Problem Relation Name Age of Onset    No Known Problems Mother      No Known Problems Father      Lung cancer Maternal Grandfather

## 2025-08-07 NOTE — ANESTHESIA PREPROCEDURE EVALUATION
Patient: Dena Enamorado    Evaluation Method: In-person visit    Procedure Information       Anesthesia Start Date/Time: 08/07/25 1749    Procedure: OBGYN D&C and/or Evaluation Post TAB    Location: [facility] OB 01 / [facility] OB    Surgeons: Tamie Paz MD          Vitals:    08/07/25 1746   BP:    Pulse: (!) 121   Resp:    Temp:    SpO2: 99%       Surgical History[1]  Medical History[2]  Current Medications[3]  Prior to Admission medications   Medication Sig Start Date End Date Taking? Authorizing Provider   miSOPROStoL (Cytotec) 200 mcg tablet Insert 4 tablets (800 mcg) into the vagina 1 time for 1 dose. Insert 4 tablets high into vagina once. May repeat dose 3 days later if needed. 8/7/25 8/7/25  Caroline Jimenes MD   etonogestreL-ethinyl estradioL (Nuvaring) 0.12-0.015 mg/24 hr vaginal ring Insert ring vaginally, leave in place for 3 weeks total, then replace on week 4 for continuous use  Patient not taking: Reported on 8/7/2025 2/12/25 8/7/25  Jennifer Keller PA-C   prenatal 115/iron/folic acid (PRENATAL 19 ORAL) Take by mouth.  8/7/25  Historical Provider, MD     RX Allergies[4]  Social History     Tobacco Use    Smoking status: Never    Smokeless tobacco: Never   Substance Use Topics    Alcohol use: Not Currently         Chemistry    Lab Results   Component Value Date/Time     08/07/2025 1448     05/21/2025 0725    K 3.7 08/07/2025 1448    K 4.7 05/21/2025 0725     08/07/2025 1448     05/21/2025 0725    CO2 25 08/07/2025 1448    CO2 25 05/21/2025 0725    BUN 16 08/07/2025 1448    BUN 19 05/21/2025 0725    CREATININE 0.78 08/07/2025 1448    CREATININE 0.73 05/21/2025 0725    Lab Results   Component Value Date/Time    CALCIUM 9.8 08/07/2025 1448    CALCIUM 9.6 05/21/2025 0725    ALKPHOS 37 08/07/2025 1448    ALKPHOS 39 05/21/2025 0725    AST 14 08/07/2025 1448    AST 29 05/21/2025 0725    ALT 19 08/07/2025 1448    ALT 41 (H) 05/21/2025 0725    BILITOT 0.4 08/07/2025 1448     "BILITOT 0.7 2025 0725          Lab Results   Component Value Date/Time    WBC 8.7 2025 1448    HGB 12.6 2025 1448    HCT 38.6 2025 1448     2025 1448     No results found for: \"PROTIME\", \"PTT\", \"INR\"  No results found for this or any previous visit (from the past 4464 hours).  No results found for this or any previous visit from the past 1095 days.     Relevant Problems   No relevant active problems       Clinical information reviewed:   Tobacco  Allergies  Meds   Med Hx  Surg Hx   Fam Hx          NPO Detail:  NPO/Void Status  Date of Last Liquid: 25  Time of Last Liquid:   Date of Last Solid: 25  Time of Last Solid: 830  Time of Last Void: 172         OB/GYN     Physical Exam    Airway  Mallampati: II  TM distance: >3 FB  Neck ROM: full  Mouth opening: 3 or more finger widths     Cardiovascular   Rhythm: regular  Rate: abnormal     Dental - normal exam     Pulmonary - normal exam   Abdominal            Anesthesia Plan    History of general anesthesia?: yes  History of complications of general anesthesia?: no    ASA 2 - emergent     general     The patient is not a current smoker.    intravenous induction   Anesthetic plan and risks discussed with patient.  Use of blood products discussed with patient who.             [1]   Past Surgical History:  Procedure Laterality Date    ANTERIOR CRUCIATE LIGAMENT REPAIR      x2     SECTION, LOW TRANSVERSE  23    NASAL SEPTUM SURGERY     [2] History reviewed. No pertinent past medical history.  [3]   Current Facility-Administered Medications:     lactated Ringer's infusion, 20 mL/hr, intravenous, Continuous, Tamie Paz MD, Last Rate: 125 mL/hr at 25 1749, New Bag at 25 174    Facility-Administered Medications Ordered in Other Encounters:     dexAMETHasone (Decadron) injection, , intravenous, PRN, Michaela Garcia, APRN-CRNA, 8 mg at 25 1801    fentaNYL PF (Sublimaze) injection, , " intravenous, PRN, Michaela Garcia, APRN-CRNA, 50 mcg at 08/07/25 1807    lidocaine (cardiac) (Xylocaine) injection, , intravenous, PRN, Michaela Garcia, APRN-CRNA, 60 mg at 08/07/25 1800    midazolam (Versed) injection, , intravenous, PRN, Michaela Garcia, APRN-CRNA, 2 mg at 08/07/25 1752    ondansetron (Zofran) injection, , intravenous, PRN, Michaela Garcia, APRN-CRNA, 4 mg at 08/07/25 1759    propofol (Diprivan) injection, , intravenous, PRN, Michaela Garcia, APRN-CRNA, 200 mg at 08/07/25 1800  [4] No Known Allergies

## 2025-08-07 NOTE — DISCHARGE SUMMARY
Discharge Summary    Admission Date: 2025  Discharge Date: 2025    Discharge Diagnosis  Incomplete  (HHS-HCC)    Hospital Course  Delivery Date: This patient has no babies on file. This patient has no babies on file.  Delivery type: This patient has no babies on file.   GA at delivery: 11w3d  Outcome: This patient has no babies on file.  Anesthesia during delivery: This patient has no babies on file.  Intrapartum complications: This patient has no babies on file.  Feeding method:       Procedures: suction D&C  Contraception at discharge: none      Pertinent Physical Exam At Time of Discharge        Last Vitals:  Temp Pulse Resp BP MAP Pulse Ox   36.9 °C (98.4 °F) (!) 114 16 107/57 76 97 %     Discharge Meds     Your medication list        START taking these medications        Instructions Last Dose Given Next Dose Due   ibuprofen 600 mg tablet      Take 1 tablet (600 mg) by mouth every 6 hours if needed for mild pain (1 - 3) for up to 10 days.       oxyCODONE 5 mg immediate release tablet  Commonly known as: Roxicodone      Take 1 tablet (5 mg) by mouth every 6 hours if needed for moderate pain (4 - 6).              STOP taking these medications      miSOPROStoL 200 mcg tablet  Commonly known as: Cytotec                  Where to Get Your Medications        These medications were sent to [id number] - [place] [id number] [facility] [place] [address]      Phone: [phone]   ibuprofen 600 mg tablet  oxyCODONE 5 mg immediate release tablet          Complications Requiring Follow-Up  Post op    Test Results Pending At Discharge  Pending Labs       Order Current Status    Surgical Pathology Exam Collected (25 1810)    Surgical Pathology Exam - PRODUCTS OF CONCEPTION Collected (25 1902)            Outpatient Follow-Up  Future Appointments   Date Time Provider Department Center   2025  9:15 AM Caroline Jimenes MD [facility]        I spent  minutes in the professional and overall care of this patient.      Tamie Paz MD

## 2025-08-11 LAB
LABORATORY COMMENT REPORT: NORMAL
PATH REPORT.FINAL DX SPEC: NORMAL
PATH REPORT.GROSS SPEC: NORMAL
PATH REPORT.RELEVANT HX SPEC: NORMAL
PATH REPORT.TOTAL CANCER: NORMAL

## 2025-08-13 ENCOUNTER — OFFICE VISIT (OUTPATIENT)
Dept: OBSTETRICS AND GYNECOLOGY | Facility: CLINIC | Age: 30
End: 2025-08-13
Payer: COMMERCIAL

## 2025-08-13 VITALS — BODY MASS INDEX: 39.43 KG/M2 | WEIGHT: 215.6 LBS | SYSTOLIC BLOOD PRESSURE: 120 MMHG | DIASTOLIC BLOOD PRESSURE: 62 MMHG

## 2025-08-13 DIAGNOSIS — O03.9 SPONTANEOUS ABORTION: Primary | ICD-10-CM

## 2025-08-13 PROCEDURE — 99212 OFFICE O/P EST SF 10 MIN: CPT | Performed by: STUDENT IN AN ORGANIZED HEALTH CARE EDUCATION/TRAINING PROGRAM

## 2025-08-13 PROCEDURE — 1036F TOBACCO NON-USER: CPT | Performed by: STUDENT IN AN ORGANIZED HEALTH CARE EDUCATION/TRAINING PROGRAM

## 2025-08-13 ASSESSMENT — PATIENT HEALTH QUESTIONNAIRE - PHQ9
2. FEELING DOWN, DEPRESSED OR HOPELESS: NOT AT ALL
1. LITTLE INTEREST OR PLEASURE IN DOING THINGS: NOT AT ALL
SUM OF ALL RESPONSES TO PHQ9 QUESTIONS 1 & 2: 0

## 2025-08-13 ASSESSMENT — ENCOUNTER SYMPTOMS
DEPRESSION: 0
LOSS OF SENSATION IN FEET: 0
OCCASIONAL FEELINGS OF UNSTEADINESS: 0

## 2025-08-13 ASSESSMENT — PAIN SCALES - GENERAL: PAINLEVEL_OUTOF10: 0-NO PAIN

## 2025-09-24 ENCOUNTER — APPOINTMENT (OUTPATIENT)
Dept: OBSTETRICS AND GYNECOLOGY | Facility: CLINIC | Age: 30
End: 2025-09-24
Payer: COMMERCIAL

## (undated) DEVICE — DRAPE PACK, LITHOTOMY PACK III, STERILE

## (undated) DEVICE — HOSE,  BOTTLE TO BOTTLE, ASSEMBLY, DISP

## (undated) DEVICE — GLOVE, SURGICAL, PROTEXIS PI , 6.5, PF, LF

## (undated) DEVICE — COLLECTION SET, VACURETTE, BERKLEY, 6 FT

## (undated) DEVICE — COLLECTION SYSTEM, GYN BERKELEY, SAFE TOUCH

## (undated) DEVICE — PREP TRAY, VAGINAL